# Patient Record
Sex: MALE | Race: BLACK OR AFRICAN AMERICAN | NOT HISPANIC OR LATINO | Employment: FULL TIME | URBAN - METROPOLITAN AREA
[De-identification: names, ages, dates, MRNs, and addresses within clinical notes are randomized per-mention and may not be internally consistent; named-entity substitution may affect disease eponyms.]

---

## 2018-01-25 ENCOUNTER — HOSPITAL ENCOUNTER (EMERGENCY)
Facility: HOSPITAL | Age: 23
Discharge: HOME | End: 2018-01-25
Attending: EMERGENCY MEDICINE | Admitting: EMERGENCY MEDICINE

## 2018-01-25 VITALS
DIASTOLIC BLOOD PRESSURE: 82 MMHG | OXYGEN SATURATION: 96 % | TEMPERATURE: 98.6 F | BODY MASS INDEX: 26.66 KG/M2 | HEIGHT: 69 IN | SYSTOLIC BLOOD PRESSURE: 141 MMHG | WEIGHT: 180 LBS | RESPIRATION RATE: 16 BRPM | HEART RATE: 85 BPM

## 2018-01-25 DIAGNOSIS — B34.9 VIRAL SYNDROME: Primary | ICD-10-CM

## 2018-01-25 DIAGNOSIS — J02.9 SORE THROAT: ICD-10-CM

## 2018-01-25 LAB
FLUAV AG NPH QL: NEGATIVE
FLUBV AG NPH QL IA: NEGATIVE
S PYO AG THROAT QL: NEGATIVE

## 2018-01-25 PROCEDURE — 87804 INFLUENZA ASSAY W/OPTIC: CPT | Performed by: EMERGENCY MEDICINE

## 2018-01-25 PROCEDURE — 87081 CULTURE SCREEN ONLY: CPT | Performed by: EMERGENCY MEDICINE

## 2018-01-25 PROCEDURE — 87880 STREP A ASSAY W/OPTIC: CPT | Performed by: EMERGENCY MEDICINE

## 2018-01-25 PROCEDURE — 99283 EMERGENCY DEPT VISIT LOW MDM: CPT

## 2018-01-25 RX ORDER — ACETAMINOPHEN 500 MG
1000 TABLET ORAL ONCE
Status: COMPLETED | OUTPATIENT
Start: 2018-01-25 | End: 2018-01-25

## 2018-01-25 RX ADMIN — ACETAMINOPHEN 1000 MG: 500 TABLET ORAL at 10:08

## 2018-01-25 NOTE — ED PROVIDER NOTES
Subjective   HPI Comments: 22-year-old male, otherwise healthy, presents complaining of sore throat, chills, and myalgias.  He states that he has had the aforementioned symptoms for the past 3 days.  No fevers.  No neck pain or stiffness.  No recent travel.  No recent sick contacts.  He is unsure as to what may have triggered his symptoms.  He is tolerating by mouth without difficulty.    Patient is a 22 y.o. male presenting with sore throat.   History provided by:  Patient  Sore Throat   Location:  Generalized  Quality:  Sore  Severity:  Mild  Onset quality:  Sudden  Duration:  3 days  Timing:  Constant  Progression:  Unchanged  Chronicity:  New  Relieved by:  None tried  Worsened by:  Nothing  Ineffective treatments:  None tried  Associated symptoms: chills and night sweats    Associated symptoms: no cough, no fever and no sinus congestion    Risk factors: no sick contacts        Review of Systems   Constitutional: Positive for chills and night sweats. Negative for fever.   HENT: Positive for sore throat.    Respiratory: Negative for cough.    All other systems reviewed and are negative.      Past Medical History:   Diagnosis Date   • ADHD (attention deficit hyperactivity disorder)    • Allergic        No Known Allergies    History reviewed. No pertinent surgical history.    Family History   Problem Relation Age of Onset   • Cancer Father        Social History     Social History   • Marital status: Single     Spouse name: N/A   • Number of children: N/A   • Years of education: N/A     Social History Main Topics   • Smoking status: Never Smoker   • Smokeless tobacco: Never Used   • Alcohol use 0.6 oz/week     1 Standard drinks or equivalent per week   • Drug use: No   • Sexual activity: Not Asked     Other Topics Concern   • None     Social History Narrative         Objective   Physical Exam   Constitutional: He is oriented to person, place, and time. He appears well-developed and well-nourished. No distress.    Well-appearing male in no acute distress   HENT:   Head: Normocephalic and atraumatic.   Mouth/Throat: Oropharynx is clear and moist.   No trismus; normal voice; no exudates; no asymmetry; uvula midline   Neck: Normal range of motion. No JVD present.   No meningeal signs   Cardiovascular: Normal rate, regular rhythm and normal heart sounds.  Exam reveals no gallop and no friction rub.    No murmur heard.  Pulmonary/Chest: Effort normal and breath sounds normal. No respiratory distress. He has no wheezes. He has no rales.   Musculoskeletal: Normal range of motion.   Lymphadenopathy:     He has no cervical adenopathy.   Neurological: He is alert and oriented to person, place, and time.   Normal gait   Skin: Skin is warm and dry. No rash noted. He is not diaphoretic. No erythema. No pallor.   Psychiatric: He has a normal mood and affect. Judgment and thought content normal.   Nursing note and vitals reviewed.      Procedures         ED Course  ED Course   Comment By Time   22-year-old male, otherwise healthy, presents complaining of chills, myalgias, and sore throat ×3 days.  On arrival to the ED, patient well-appearing with unremarkable exam and reassuring vital signs.  Tylenol given.  We will obtain flu and strep swabs and reassess. Charanjit Kingston MD 01/25 1007   Strep and flu negative.  Patient reassured and counseled regarding symptomatic treatment of likely viral syndrome.  He will follow-up with his primary care physician within one week.  Agreeable with plan and given appropriate return precautions. Charanjit Kingston MD 01/25 1058       Recent Results (from the past 24 hour(s))   Influenza Antigen, Rapid - Swab, Nasopharynx    Collection Time: 01/25/18 10:04 AM   Result Value Ref Range    Influenza A Ag, EIA Negative Negative    Influenza B Ag, EIA Negative Negative   Rapid Strep A Screen - Swab, Throat    Collection Time: 01/25/18 10:04 AM   Result Value Ref Range    Strep A Ag Negative Negative  "    Note: In addition to lab results from this visit, the labs listed above may include labs taken at another facility or during a different encounter within the last 24 hours. Please correlate lab times with ED admission and discharge times for further clarification of the services performed during this visit.    No orders to display     Vitals:    01/25/18 0957   BP: 141/82   BP Location: Left arm   Patient Position: Sitting   Pulse: 87   Resp: 14   Temp: 98.4 °F (36.9 °C)   TempSrc: Oral   SpO2: 97%   Weight: 81.6 kg (180 lb)   Height: 175.3 cm (69\")     Medications   acetaminophen (TYLENOL) tablet 1,000 mg (1,000 mg Oral Given 1/25/18 1008)     ECG/EMG Results (last 24 hours)     ** No results found for the last 24 hours. **                  Recent Results (from the past 24 hour(s))   Influenza Antigen, Rapid - Swab, Nasopharynx    Collection Time: 01/25/18 10:04 AM   Result Value Ref Range    Influenza A Ag, EIA Negative Negative    Influenza B Ag, EIA Negative Negative   Rapid Strep A Screen - Swab, Throat    Collection Time: 01/25/18 10:04 AM   Result Value Ref Range    Strep A Ag Negative Negative     Note: In addition to lab results from this visit, the labs listed above may include labs taken at another facility or during a different encounter within the last 24 hours. Please correlate lab times with ED admission and discharge times for further clarification of the services performed during this visit.    No orders to display     Vitals:    01/25/18 0957   BP: 141/82   BP Location: Left arm   Patient Position: Sitting   Pulse: 87   Resp: 14   Temp: 98.4 °F (36.9 °C)   TempSrc: Oral   SpO2: 97%   Weight: 81.6 kg (180 lb)   Height: 175.3 cm (69\")     Medications   acetaminophen (TYLENOL) tablet 1,000 mg (1,000 mg Oral Given 1/25/18 1008)     ECG/EMG Results (last 24 hours)     ** No results found for the last 24 hours. **            [facility]    Final diagnoses:   Viral syndrome   Sore throat       Documentation " assistance provided by mahendra Zamorano.  Information recorded by the mahendra was done at my direction and has been verified and validated by me.     Daryl Zamorano  01/25/18 1003       Aundrea Alas  01/25/18 1004       Daryl Zamorano  01/25/18 1032       Daryl Zamorano  01/25/18 1101       Charanjit Kingston MD  01/25/18 1118

## 2018-01-25 NOTE — DISCHARGE INSTRUCTIONS
Follow up with primary care physician in one week. Call to make an appointment.     Immediately return to the emergency department for any new or worsening symptoms.     Please review the medications you are supposed to be taking according to prior physician recommendations. I have not changed your home medications during this visit. If your discharge instructions indicate that I have changed your home medications, this is not the case, and you should disregard. If you have any questions about the medication you should be taking at home, please call your physician.

## 2018-01-27 ENCOUNTER — HOSPITAL ENCOUNTER (EMERGENCY)
Facility: HOSPITAL | Age: 23
Discharge: HOME | End: 2018-01-27
Attending: EMERGENCY MEDICINE | Admitting: EMERGENCY MEDICINE

## 2018-01-27 VITALS
OXYGEN SATURATION: 100 % | RESPIRATION RATE: 16 BRPM | BODY MASS INDEX: 26.66 KG/M2 | WEIGHT: 180 LBS | HEIGHT: 69 IN | SYSTOLIC BLOOD PRESSURE: 135 MMHG | TEMPERATURE: 100.9 F | DIASTOLIC BLOOD PRESSURE: 75 MMHG | HEART RATE: 100 BPM

## 2018-01-27 DIAGNOSIS — J02.9 PHARYNGITIS, UNSPECIFIED ETIOLOGY: ICD-10-CM

## 2018-01-27 DIAGNOSIS — B08.5 HERPANGINA: ICD-10-CM

## 2018-01-27 DIAGNOSIS — R50.9 FEVER AND CHILLS: ICD-10-CM

## 2018-01-27 DIAGNOSIS — K13.70 LESION OF ORAL MUCOSA: Primary | ICD-10-CM

## 2018-01-27 LAB
BACTERIA SPEC AEROBE CULT: NORMAL
S PYO AG THROAT QL: NEGATIVE

## 2018-01-27 PROCEDURE — 87255 GENET VIRUS ISOLATE HSV: CPT | Performed by: PHYSICIAN ASSISTANT

## 2018-01-27 PROCEDURE — 87081 CULTURE SCREEN ONLY: CPT | Performed by: PHYSICIAN ASSISTANT

## 2018-01-27 PROCEDURE — 99283 EMERGENCY DEPT VISIT LOW MDM: CPT

## 2018-01-27 PROCEDURE — 87880 STREP A ASSAY W/OPTIC: CPT | Performed by: PHYSICIAN ASSISTANT

## 2018-01-27 RX ORDER — AMOXICILLIN 500 MG/1
1000 CAPSULE ORAL 3 TIMES DAILY
Qty: 30 CAPSULE | Refills: 0 | Status: SHIPPED | OUTPATIENT
Start: 2018-01-27

## 2018-01-27 RX ORDER — ACYCLOVIR 400 MG/1
400 TABLET ORAL 3 TIMES DAILY
Qty: 30 TABLET | Refills: 0 | Status: SHIPPED | OUTPATIENT
Start: 2018-01-27

## 2018-01-27 NOTE — DISCHARGE INSTRUCTIONS
Acyclovir and Amoxil as prescribed.  Tylenol or Motrin as directed for fever and pain.  Warm salt water gargles several times daily.  Return to ER if worse.  Choose one of the [facility] providers below for follow up and to establish a PCP.  Follow up with one of the [facility] physician groups below to setup primary care. If you have trouble following up, please call Shu Martinez, our transitional care nurse, at [phone].    (Dr. Miramontes, Dr. Craig, Dr. Felipe, and Dr. Andrew.)  [facility], Primary Care, [phone], [address], [place] [address]    [facility], Primary Care, [phone], [address], [address] [place], [address] [facility], Primary Care, [phone], [address], [address] [place], [address] [facility], Primary Care, [phone], [address] [place], [address] [place], [address]     [place] [facility], Primary Care, [phone], [address], [address] [place], [address]    [place] [facility], Primary Care, [phone], [address]    [place], [address] [facility], Primary Care, [phone], [address], [address] [place], [address] [facility], Primary Care, [phone], [address], [address] [place], [address] [facility]) [facility], Primary Care, [phone], [address], [address] [place], [address] [facility], Primary Care, [phone], [address], [address] [place], [address] [place] [address], Primary Care, [phone], [address], [place], [address] [place] [address]  [facility], Primary Care, [phone], [address] [id number]

## 2018-01-27 NOTE — ED PROVIDER NOTES
"Subjective   HPI Comments: 22-year-old male presents to the emergency department with complaints of sore throat, painful lesions in the mouth and on the lower lip and a fever.  The symptoms began about 1 week ago and have worsened.  The patient states that he was seen in our emergency department several days ago with the same symptoms and was diagnosed with a \"viral syndrome\".  He states that his sore throat is much worse now and he is having fever now that was not present before no past pertinent medical history.  The patient smokes a half pack cigarettes daily.  No alcohol or drug use.  He has no PCP.    Patient is a 22 y.o. male presenting with sore throat.   History provided by:  Patient  Sore Throat   Quality:  Sore  Severity:  Severe  Onset quality:  Gradual  Duration: 1 week.  Timing:  Constant  Progression:  Worsening  Chronicity:  New  Relieved by:  Nothing  Worsened by:  Nothing  Ineffective treatments:  None tried  Associated symptoms: chills, fever and headaches    Associated symptoms: no abdominal pain, no adenopathy, no chest pain, no cough, no drooling, no ear pain, no epistaxis, no eye discharge, no neck stiffness, no rash, no rhinorrhea, no shortness of breath, no sinus congestion and no trouble swallowing        Review of Systems   Constitutional: Positive for chills and fever.   HENT: Positive for sore throat. Negative for congestion, drooling, ear pain, nosebleeds, rhinorrhea and trouble swallowing.         Sores in the mouth and lower lip   Eyes: Negative for pain, discharge and visual disturbance.   Respiratory: Negative for cough, shortness of breath and wheezing.    Cardiovascular: Negative for chest pain, palpitations and leg swelling.   Gastrointestinal: Negative for abdominal pain, blood in stool, diarrhea, nausea and vomiting.   Endocrine: Negative.    Genitourinary: Negative for dysuria, hematuria and urgency.   Musculoskeletal: Negative for arthralgias, back pain and neck stiffness. "   Skin: Negative for pallor and rash.   Allergic/Immunologic: Negative for immunocompromised state.   Neurological: Positive for headaches. Negative for dizziness, speech difficulty and weakness.   Hematological: Negative for adenopathy. Does not bruise/bleed easily.   Psychiatric/Behavioral: Negative.        Past Medical History:   Diagnosis Date   • ADHD (attention deficit hyperactivity disorder)    • Allergic        No Known Allergies    History reviewed. No pertinent surgical history.    Family History   Problem Relation Age of Onset   • Cancer Father        Social History     Social History   • Marital status: Single     Spouse name: N/A   • Number of children: N/A   • Years of education: N/A     Social History Main Topics   • Smoking status: Never Smoker   • Smokeless tobacco: Never Used   • Alcohol use 0.6 oz/week     1 Standard drinks or equivalent per week   • Drug use: No   • Sexual activity: Yes     Other Topics Concern   • None     Social History Narrative   • None           Objective   Physical Exam   Constitutional: He appears well-developed and well-nourished. No distress.   HENT:   The posterior pharynx has erythema and white exudate present.  No abscess.  The patient has a papule on his lower lip that appears herpetic.  He also has a lesion inside the lower lip and another lesion under his tongue.   Eyes: Conjunctivae and EOM are normal. Pupils are equal, round, and reactive to light.   Neck: Normal range of motion. Neck supple.   Cardiovascular: Normal rate, regular rhythm and normal heart sounds.    Pulmonary/Chest: Effort normal and breath sounds normal.   Abdominal: Soft. Bowel sounds are normal. There is no tenderness.   Musculoskeletal: Normal range of motion.   Lymphadenopathy:     He has no cervical adenopathy.   Neurological: He is alert.   Skin: Skin is warm and dry.   Psychiatric: He has a normal mood and affect.       Procedures         ED Course  ED Course      The patient has several  lesions that appear herpetic.  He also has extensive pharyngeal erythema with white exudate.  No abscess seen.  He has 2 lesions inside the mouth that appear to be apthous ulcers.  The patient states that he does engage in oral vaginal intercourse but states that his girlfriend does not have any vaginal lesions that he is aware of.  She confirms this.  The patient now has a fever 101.  He otherwise does not appear septic.  Previous throat culture from last week is negative for hemolytic strep.  A repeat strep screen and throat culture have been obtained as well as herpes culture.  Given his fever and the worsening lesions, I am inclined to treat him with both acyclovir and an antibiotic and have him follow-up.  I have encouraged him to use warm salt water swishes and gargles.            MDM    Final diagnoses:   Lesion of oral mucosa   Herpangina   Pharyngitis, unspecified etiology   Fever and chills            AUTUMN Awad  01/27/18 8465

## 2018-01-29 LAB — BACTERIA SPEC AEROBE CULT: NORMAL

## 2018-01-30 LAB — HSV SPEC CULT: POSITIVE

## 2018-05-12 ENCOUNTER — APPOINTMENT (OUTPATIENT)
Dept: GENERAL RADIOLOGY | Facility: HOSPITAL | Age: 23
End: 2018-05-12

## 2018-05-12 ENCOUNTER — HOSPITAL ENCOUNTER (EMERGENCY)
Facility: HOSPITAL | Age: 23
Discharge: HOME | End: 2018-05-12
Attending: EMERGENCY MEDICINE | Admitting: EMERGENCY MEDICINE

## 2018-05-12 VITALS
WEIGHT: 185 LBS | HEIGHT: 69 IN | OXYGEN SATURATION: 96 % | BODY MASS INDEX: 27.4 KG/M2 | RESPIRATION RATE: 16 BRPM | DIASTOLIC BLOOD PRESSURE: 74 MMHG | SYSTOLIC BLOOD PRESSURE: 124 MMHG | TEMPERATURE: 98.5 F | HEART RATE: 74 BPM

## 2018-05-12 DIAGNOSIS — S83.91XA SPRAIN OF RIGHT KNEE, INITIAL ENCOUNTER: ICD-10-CM

## 2018-05-12 DIAGNOSIS — M25.561 ACUTE PAIN OF RIGHT KNEE: Primary | ICD-10-CM

## 2018-05-12 PROCEDURE — 73560 X-RAY EXAM OF KNEE 1 OR 2: CPT

## 2018-05-12 PROCEDURE — 99283 EMERGENCY DEPT VISIT LOW MDM: CPT

## 2018-05-12 RX ORDER — HYDROCODONE BITARTRATE AND ACETAMINOPHEN 5; 325 MG/1; MG/1
1 TABLET ORAL EVERY 6 HOURS PRN
Qty: 12 TABLET | Refills: 0 | Status: SHIPPED | OUTPATIENT
Start: 2018-05-12

## 2018-05-12 RX ORDER — HYDROCODONE BITARTRATE AND ACETAMINOPHEN 5; 325 MG/1; MG/1
1 TABLET ORAL ONCE
Status: COMPLETED | OUTPATIENT
Start: 2018-05-12 | End: 2018-05-12

## 2018-05-12 RX ADMIN — HYDROCODONE BITARTRATE AND ACETAMINOPHEN 1 TABLET: 5; 325 TABLET ORAL at 03:44

## 2018-05-12 NOTE — ED PROVIDER NOTES
Subjective   Estella Peacock is a 23 y.o.male who presents to the ED with his friend by personal vehicle with c/o right knee pain with onset several hours ago that has remained constant. He was playing basketball when he jumped then landed on his right leg in a twisting motion. He denies any falls and states that he was able to bear weight and ambulate although it caused him to experience worsening pain. His pain significantly worsens with any movement or palpation of the right knee. He denies any other past medical history or any other acute complaints at this time.           History provided by:  Patient  Leg Pain   Location:  Knee  Injury: yes    Mechanism of injury: fall    Fall:     Fall occurred:  Recreating/playing    Impact surface:  Vevay    Point of impact:  Feet    Entrapped after fall: no    Knee location:  R knee  Pain details:     Quality:  Sharp    Radiates to:  Does not radiate    Severity:  Moderate    Onset quality:  Gradual    Timing:  Constant    Progression:  Worsening  Chronicity:  New  Dislocation: no    Foreign body present:  No foreign bodies  Tetanus status:  Unknown  Prior injury to area:  No  Relieved by:  Immobilization  Worsened by:  Bearing weight, flexion, extension and activity  Ineffective treatments:  None tried  Associated symptoms: decreased ROM    Associated symptoms: no swelling and no tingling    Risk factors: no recent illness        Review of Systems   Cardiovascular: Negative for leg swelling.   Musculoskeletal: Positive for arthralgias.   Neurological: Negative for numbness.   All other systems reviewed and are negative.      Past Medical History:   Diagnosis Date   • ADHD (attention deficit hyperactivity disorder)    • Allergic        No Known Allergies    History reviewed. No pertinent surgical history.    Family History   Problem Relation Age of Onset   • Cancer Father        Social History     Social History   • Marital status: Single     Social History Main  Topics   • Smoking status: Never Smoker   • Smokeless tobacco: Never Used   • Alcohol use Yes      Comment: OCCASIONAL   • Drug use: No   • Sexual activity: Yes     Other Topics Concern   • Not on file         Objective   Physical Exam   Constitutional: He is oriented to person, place, and time. He appears well-developed and well-nourished. No distress.   HENT:   Head: Normocephalic and atraumatic.   Right Ear: External ear normal.   Left Ear: External ear normal.   Nose: Nose normal.   Eyes: Conjunctivae are normal. No scleral icterus.   Neck: Normal range of motion. Neck supple.   Cardiovascular: Normal rate.    Pulmonary/Chest: Effort normal.   Abdominal: Soft.   Musculoskeletal: He exhibits tenderness. He exhibits no deformity.   TTP posterior right knee.  Laxity with Veress and valgus stress.  Pain exhibited with attempting anterior and posterior dosi limited due to worsening pain.    Neurological: He is alert and oriented to person, place, and time.   Skin: Skin is warm and dry. Capillary refill takes less than 2 seconds. He is not diaphoretic.   Psychiatric: He has a normal mood and affect. His behavior is normal.   Nursing note and vitals reviewed.      Procedures         ED Course  ED Course     No results found for this or any previous visit (from the past 24 hour(s)).  Note: In addition to lab results from this visit, the labs listed above may include labs taken at another facility or during a different encounter within the last 24 hours. Please correlate lab times with ED admission and discharge times for further clarification of the services performed during this visit.    XR Knee 1 or 2 View Right   Final Result      No definite acute bone abnormality.      Small suprapatellar bursal effusion.      If significant soft tissue injury is clinically suspected, MRI could be formed    which better evaluates intra-articular soft tissue structures.         THIS DOCUMENT HAS BEEN ELECTRONICALLY SIGNED BY MARY  "MARCOS NOVAK        Vitals:    05/12/18 0248   BP: 128/77   BP Location: Left arm   Patient Position: Sitting   Pulse: 78   Resp: 16   Temp: 98.7 °F (37.1 °C)   TempSrc: Oral   SpO2: 96%   Weight: 83.9 kg (185 lb)   Height: 175.3 cm (69\")     Medications   HYDROcodone-acetaminophen (NORCO) 5-325 MG per tablet 1 tablet (1 tablet Oral Given 5/12/18 9678)     ECG/EMG Results (last 24 hours)     ** No results found for the last 24 hours. **                          MDM  Number of Diagnoses or Management Options  Acute pain of right knee: new and requires workup  Sprain of right knee, initial encounter: new and requires workup  Diagnosis management comments: Is concerned that the patient has sprained potentially tore a ligament to the right knee.  Patient's pain precludes obtaining a detailed exam.    X-ray does not show any acute fracture.    Patient will be given a knee immobilizer, advised to keep elevated and apply ice, and use crutches to aid in ambulation.    Patient will be referred to Dr. Dinero, orthopedic surgery, for outpatient evaluation.       Amount and/or Complexity of Data Reviewed  Tests in the radiology section of CPT®: ordered and reviewed  Review and summarize past medical records: yes  Independent visualization of images, tracings, or specimens: yes    Patient Progress  Patient progress: stable      Final diagnoses:   Acute pain of right knee   Sprain of right knee, initial encounter       Documentation assistance provided by mahendra Soares.  Information recorded by the scribe was done at my direction and has been verified and validated by me.     Benjie Soares  05/12/18 0359       Caprice Ji MD  05/12/18 0506    "

## 2018-05-12 NOTE — DISCHARGE INSTRUCTIONS
Apply ice to right knee.     Wear knee immobilizer and use crutches to aid in ambulation.     Follow-up with orthopedic surgery for outpatient evaluation.

## 2023-04-06 ENCOUNTER — HOSPITAL ENCOUNTER (EMERGENCY)
Facility: HOSPITAL | Age: 28
Discharge: HOME | End: 2023-04-06
Attending: EMERGENCY MEDICINE | Admitting: EMERGENCY MEDICINE

## 2023-04-06 ENCOUNTER — APPOINTMENT (OUTPATIENT)
Dept: GENERAL RADIOLOGY | Facility: HOSPITAL | Age: 28
End: 2023-04-06

## 2023-04-06 VITALS
HEIGHT: 62 IN | HEART RATE: 73 BPM | BODY MASS INDEX: 44.16 KG/M2 | DIASTOLIC BLOOD PRESSURE: 86 MMHG | OXYGEN SATURATION: 99 % | TEMPERATURE: 98.4 F | WEIGHT: 240 LBS | SYSTOLIC BLOOD PRESSURE: 149 MMHG | RESPIRATION RATE: 14 BRPM

## 2023-04-06 DIAGNOSIS — J20.8 ACUTE VIRAL BRONCHITIS: Primary | ICD-10-CM

## 2023-04-06 LAB
FLUAV RNA RESP QL NAA+PROBE: NOT DETECTED
FLUBV RNA RESP QL NAA+PROBE: NOT DETECTED
QT INTERVAL: 374 MS
QTC INTERVAL: 409 MS
RSV RNA NPH QL NAA+NON-PROBE: NOT DETECTED
SARS-COV-2 RNA RESP QL NAA+PROBE: NOT DETECTED

## 2023-04-06 PROCEDURE — 71045 X-RAY EXAM CHEST 1 VIEW: CPT

## 2023-04-06 PROCEDURE — 87637 SARSCOV2&INF A&B&RSV AMP PRB: CPT | Performed by: EMERGENCY MEDICINE

## 2023-04-06 PROCEDURE — 93005 ELECTROCARDIOGRAM TRACING: CPT | Performed by: EMERGENCY MEDICINE

## 2023-04-06 PROCEDURE — C9803 HOPD COVID-19 SPEC COLLECT: HCPCS | Performed by: EMERGENCY MEDICINE

## 2023-04-06 PROCEDURE — 99282 EMERGENCY DEPT VISIT SF MDM: CPT

## 2023-04-06 RX ORDER — ALBUTEROL SULFATE 90 UG/1
2 AEROSOL, METERED RESPIRATORY (INHALATION) EVERY 6 HOURS PRN
Qty: 8 G | Refills: 0 | Status: SHIPPED | OUTPATIENT
Start: 2023-04-06

## 2023-04-06 NOTE — DISCHARGE INSTRUCTIONS
Push fluids.  I wrote you for an albuterol inhaler which may help with your symptoms.    Very important to discontinue smoking.    I have referred you to our primary care contact number.

## 2023-04-06 NOTE — ED PROVIDER NOTES
EMERGENCY DEPARTMENT ENCOUNTER    Pt Name: Estella Peacock  MRN: [id number]  Pt :   1995  Room Number:  1818  Date of encounter:  2023  PCP: Provider, No Known  ED Provider: Semaj Knight MD    Historian: Patient      HPI:  Chief Complaint: Cough, shortness of breath        Context: Estella Peacock is a 28 y.o. male who presents to the ED c/o cough with mild shortness of breath ongoing for the last week.  The cough has been nonproductive.  He does not recall being around anyone who is sick.  He denies significant dyspnea and is able to ambulate without any shortness of breath.  He denies chest pain and hemoptysis.  No swelling of legs.  No DVT/PE history.        PAST MEDICAL HISTORY  Past Medical History:   Diagnosis Date   • ADHD (attention deficit hyperactivity disorder)    • Allergic          PAST SURGICAL HISTORY  No past surgical history on file.      FAMILY HISTORY  Family History   Problem Relation Age of Onset   • Cancer Father          SOCIAL HISTORY  Social History     Socioeconomic History   • Marital status: Single   Tobacco Use   • Smoking status: Never   • Smokeless tobacco: Never   Substance and Sexual Activity   • Alcohol use: Yes     Comment: OCCASIONAL   • Drug use: No   • Sexual activity: Yes         ALLERGIES  Patient has no known allergies.        REVIEW OF SYSTEMS  Review of Systems       All systems reviewed and negative except for those discussed in HPI.       PHYSICAL EXAM    I have reviewed the triage vital signs and nursing notes.    ED Triage Vitals [23 1635]   Temp Heart Rate Resp BP SpO2   98.4 °F (36.9 °C) 73 14 149/86 99 %      Temp src Heart Rate Source Patient Position BP Location FiO2 (%)   Oral Monitor Sitting Left arm --       Physical Exam  GENERAL:   Appears in no acute distress.  Strong odor of cannabis present on patient.  HENT: Nares patent.  EYES: No scleral icterus.  CV: Regular rhythm, regular rate.  No murmurs gallops rubs clear to auscultation  in all fields anteriorly and posteriorly  RESPIRATORY: Normal effort.  No audible wheezes, rales or rhonchi.  ABDOMEN: Soft, nontender  MUSCULOSKELETAL: No deformities.   NEURO: Alert, moves all extremities, follows commands.  SKIN: Warm, dry, no rash visualized.      LAB RESULTS  Recent Results (from the past 24 hour(s))   ECG 12 Lead Other; SOA    Collection Time: 04/06/23  4:41 PM   Result Value Ref Range    QT Interval 374 ms    QTC Interval 409 ms   COVID-19, FLU A/B, RSV PCR - Swab, Nasopharynx    Collection Time: 04/06/23  5:18 PM    Specimen: Nasopharynx; Swab   Result Value Ref Range    COVID19 Not Detected Not Detected - Ref. Range    Influenza A PCR Not Detected Not Detected    Influenza B PCR Not Detected Not Detected    RSV, PCR Not Detected Not Detected       If labs were ordered, I independently reviewed the results and considered them in treating the patient.        RADIOLOGY  XR Chest 1 View    Result Date: 4/6/2023  XR CHEST 1 VW Date of Exam: 4/6/2023 4:59 PM EDT Indication: cough. Comparison: None available. Findings: Normal cardiomediastinal silhouette. The lungs are clear. No pleural effusion or pneumothorax. No acute osseous findings.     Impression: No acute cardiopulmonary findings.  Electronically Signed: Stefan Degroot  4/6/2023 5:31 PM EDT  Workstation ID: [id number]      I ordered and independently reviewed the above noted radiographic studies.      I viewed images of chest x-ray which showed no active disease per my independent interpretation.    See radiologist's dictation for official interpretation.        PROCEDURES    Procedures    ECG 12 Lead Other; SOA   Final Result   Test Reason : Other~   Blood Pressure :   */*   mmHG   Vent. Rate :  72 BPM     Atrial Rate :  72 BPM      P-R Int : 136 ms          QRS Dur :  82 ms       QT Int : 374 ms       P-R-T Axes :  34  42  11 degrees      QTc Int : 409 ms      Normal sinus rhythm   Normal ECG   No previous ECGs available   Confirmed by  ELSY BERG MD ([id number]) on 4/6/2023 6:17:45 PM      Referred By: EDMD           Confirmed By: ELSY BERG MD          MEDICATIONS GIVEN IN ER    Medications - No data to display      MEDICAL DECISION MAKING, PROGRESS, and CONSULTS    All labs have been independently reviewed by me.  All radiology studies have been reviewed by me and the radiologist dictating the report.  All EKG's have been independently viewed and interpreted by me.      Discussion below represents my analysis of pertinent findings related to patient's condition, differential diagnosis, treatment plan and final disposition.      Differential diagnosis:    Bronchitis versus pneumonia versus COVID versus RSV versus PE, etc.      Additional sources:      - External (non-ED) record review: Epic chart review    - Chronic or social conditions impacting care: Chronic use of marijuana and tobacco    - Shared decision making: Patient in full agreement with current plan      Orders placed during this visit:  Orders Placed This Encounter   Procedures   • COVID PRE-OP / PRE-PROCEDURE SCREENING ORDER (NO ISOLATION) - Swab, Nasopharynx   • COVID-19, FLU A/B, RSV PCR - Swab, Nasopharynx   • XR Chest 1 View   • ECG 12 Lead Other; SOA         Additional orders considered but not ordered:  CTA chest    ED Course:    Consultants:                      AS OF 18:20 EDT VITALS:    BP - 149/86  HR - 73  TEMP - 98.4 °F (36.9 °C) (Oral)  O2 SATS - 99%                  DIAGNOSIS  Final diagnoses:   Acute viral bronchitis         DISPOSITION  DISCHARGE    Patient discharged in stable condition.    Reviewed implications of results, diagnosis, meds, responsibility to follow up, warning signs and symptoms of possible worsening, potential complications and reasons to return to ER.    Patient/Family voiced understanding of above instructions.    Discussed plan for discharge, as there is no emergent indication for admission.  Pt/family is agreeable and understands need for follow  up and possible repeat testing.  Pt/family is aware that discharge does not mean that nothing is wrong but that it indicates no emergency is currently present that requires admission and they must continue care with follow-up as given below or with a physician of their choice.     FOLLOW-UP  PATIENT CONNECTION - [facility] [address]  [phone]    NEXT AVAILABLE APPOINTMENT - RECHECK OF CONDITION    [facility] Emergency Department  [address] [address] [phone]    IF YOU HAVE ANY CONCERN OF WORSENING CONDITION         Medication List      New Prescriptions    albuterol sulfate  (90 Base) MCG/ACT inhaler  Commonly known as: PROVENTIL HFA;VENTOLIN HFA;PROAIR HFA  Inhale 2 puffs Every 6 (Six) Hours As Needed for Wheezing. With AeroChamber           Where to Get Your Medications      These medications were sent to [facility] [address] [place] [address] - [address] [place] [address] [phone]  - [phone] [address] [address]    Phone: [phone]   · albuterol sulfate  (90 Base) MCG/ACT inhaler             Please note that portions of this document were completed with voice recognition software.      Semaj Knight MD  04/06/23 1005

## 2023-06-06 ENCOUNTER — HOSPITAL ENCOUNTER (EMERGENCY)
Facility: HOSPITAL | Age: 28
Discharge: HOME | End: 2023-06-06
Attending: EMERGENCY MEDICINE

## 2023-06-06 VITALS
SYSTOLIC BLOOD PRESSURE: 145 MMHG | RESPIRATION RATE: 18 BRPM | TEMPERATURE: 99.1 F | BODY MASS INDEX: 34.36 KG/M2 | HEART RATE: 69 BPM | OXYGEN SATURATION: 97 % | DIASTOLIC BLOOD PRESSURE: 101 MMHG | WEIGHT: 240 LBS | HEIGHT: 70 IN

## 2023-06-06 DIAGNOSIS — K04.7 DENTAL INFECTION: Primary | ICD-10-CM

## 2023-06-06 DIAGNOSIS — K02.9 DENTAL CARIES: ICD-10-CM

## 2023-06-06 RX ORDER — AMOXICILLIN AND CLAVULANATE POTASSIUM 875; 125 MG/1; MG/1
1 TABLET, FILM COATED ORAL ONCE
Status: COMPLETED | OUTPATIENT
Start: 2023-06-06 | End: 2023-06-06

## 2023-06-06 RX ORDER — HYDROCODONE BITARTRATE AND ACETAMINOPHEN 7.5; 325 MG/1; MG/1
1 TABLET ORAL ONCE
Status: COMPLETED | OUTPATIENT
Start: 2023-06-06 | End: 2023-06-06

## 2023-06-06 RX ORDER — AMOXICILLIN AND CLAVULANATE POTASSIUM 875; 125 MG/1; MG/1
1 TABLET, FILM COATED ORAL 2 TIMES DAILY
Qty: 20 TABLET | Refills: 0 | Status: SHIPPED | OUTPATIENT
Start: 2023-06-06 | End: 2023-06-16

## 2023-06-06 RX ADMIN — AMOXICILLIN AND CLAVULANATE POTASSIUM 1 TABLET: 875; 125 TABLET, FILM COATED ORAL at 20:34

## 2023-06-06 RX ADMIN — HYDROCODONE BITARTRATE AND ACETAMINOPHEN 1 TABLET: 7.5; 325 TABLET ORAL at 20:34

## 2023-06-06 NOTE — Clinical Note
[facility] EMERGENCY DEPARTMENT  [address] [address]  Phone: [phone]    Estella Peacock was seen and treated in our emergency department on 6/6/2023.  He may return to work on 06/08/2023.         Thank you for choosing [facility].    Abhay Malone MD

## 2023-06-07 NOTE — DISCHARGE INSTRUCTIONS
Symptomatic care is recommended. Take all medications as prescribed and instructed. Take and complete full course of antibiotics as prescribed. Follow up with dentistry as directed or return to Emergency Department with worsening of symptoms.

## 2023-06-21 NOTE — ED PROVIDER NOTES
EMERGENCY DEPARTMENT ENCOUNTER    Pt Name: Estella Peacock  MRN: [id number]  Pt :   1995  Room Number:  Room/bed info not found  Date of encounter:  2023  PCP: Provider, No Known  ED Provider: AUTUMN Robledo    Historian: Patient    HPI:  Chief Complaint: Dental Pain    Context: Estella Peacock is a 28 y.o. male who presents to the ED c/o dental pain.  Patient notes that he has a cracked tooth of his right rear lower jawline.  He states that he has a follow-up with dentistry scheduled for tomorrow but cannot seem to get pain from the tooth under control.    Dental Pain  Location:  Lower  Lower teeth location:   2nd molar  Quality:  Aching and constant  Severity:  Severe  Onset quality:  Gradual  Duration: Chronic.  Timing:  Constant  Progression:  Worsening  Chronicity:  Chronic  Context: dental caries, enamel fracture and poor dentition    Relieved by:  Nothing  Ineffective treatments:  NSAIDs, topical anesthetic gel and acetaminophen  Associated symptoms: gum swelling    Associated symptoms: no difficulty swallowing, no drooling, no facial pain, no facial swelling, no neck swelling, no oral bleeding and no oral lesions    Risk factors: lack of dental care       REVIEW OF SYSTEMS  A chief complaint appropriate review of systems was completed and is negative except as noted in the HPI.     PAST MEDICAL HISTORY  Past Medical History:   Diagnosis Date    ADHD (attention deficit hyperactivity disorder)     Allergic        PAST SURGICAL HISTORY  No past surgical history on file.    FAMILY HISTORY  Family History   Problem Relation Age of Onset    Cancer Father        SOCIAL HISTORY  Social History     Socioeconomic History    Marital status: Single   Tobacco Use    Smoking status: Never    Smokeless tobacco: Never   Substance and Sexual Activity    Alcohol use: Yes     Comment: OCCASIONAL    Drug use: No    Sexual activity: Yes       ALLERGIES  Patient has no known  allergies.    PHYSICAL EXAM  Physical Exam  Vitals and nursing note reviewed.   Constitutional:       General: He is not in acute distress.     Appearance: Normal appearance. He is not ill-appearing.   HENT:      Head: Normocephalic and atraumatic.      Nose: Nose normal.      Mouth/Throat:      Mouth: Mucous membranes are moist.      Dentition: Dental caries present.        Comments: Site where filling has cracked in patient's tooth  Eyes:      Extraocular Movements: Extraocular movements intact.   Cardiovascular:      Rate and Rhythm: Normal rate.   Pulmonary:      Effort: Pulmonary effort is normal.   Abdominal:      General: There is no distension.   Musculoskeletal:         General: Normal range of motion.      Cervical back: Normal range of motion and neck supple.   Skin:     General: Skin is warm and dry.   Neurological:      General: No focal deficit present.      Mental Status: He is alert.   Psychiatric:         Mood and Affect: Mood normal.         Behavior: Behavior normal.       LAB RESULTS      If labs were ordered, I independently reviewed the results and considered them in treating the patient.    RADIOLOGY  No orders to display     [] Radiologist's Report Reviewed:  I ordered and independently reviewed the above noted radiographic studies.  See radiologist's dictation for official interpretation.      PROCEDURES    Procedures    No orders to display       MEDICATIONS GIVEN IN ER    Medications   HYDROcodone-acetaminophen (NORCO) 7.5-325 MG per tablet 1 tablet (1 tablet Oral Given 6/6/23 2034)   amoxicillin-clavulanate (AUGMENTIN) 875-125 MG per tablet 1 tablet (1 tablet Oral Given 6/6/23 2034)       MEDICAL DECISION MAKING, PROGRESS, and CONSULTS   Medical Decision Making  Problems Addressed:  Dental caries: complicated acute illness or injury  Dental infection: complicated acute illness or injury    Risk  Prescription drug management.        All labs have been independently reviewed by me.  All  radiology studies have been reviewed by me and the radiologist dictating the report.  All EKG's have been independently viewed by me.    [] Discussed with radiology regarding test interpretation:    Discussion below represents my analysis of pertinent findings related to patient's condition, differential diagnosis, treatment plan and final disposition.    Differential diagnosis:  The differential diagnosis associated with the patient's presentation includes: Tooth fracture, avulsion, or bleeding socket. No evidence of retropharyngeal abscess, peritonsillar abscess, David’s angina, periapical abscess and other odontogenic infections.      Additional sources  Discussed/ obtained information from independent historians:   [] Spouse  [] Parent  [] Family member  [] Friend  [] EMS   [] Other:  External (non-ED) record review:   [] Inpatient record:   [] Office record:   [] Outpatient record:   [] Prior Outpatient labs:   [] Prior Outpatient radiology:   [] Primary Care record:   [] Outside ED record:   [] Other:   Patient's care impacted by:   [] Diabetes  [] Hypertension  [] Hyperlipidemia  [] Hypothyroidism   [] Coronary Artery Disease   [] COPD   [] Cancer   [] Obesity  [] GERD   [] Tobacco Abuse   [] Substance Abuse    [] Anxiety   [] Depression   [] Other:   Care significantly affected by Social Determinants of Health (housing and economic circumstances, unemployment)    [] Yes     [x] No   If yes, Patient's care significantly limited by  Social Determinants of Health including:   [] Inadequate housing   [] Low income   [] Alcoholism and drug addiction in family   [] Problems related to primary support group   [] Unemployment   [] Problems related to employment   [] Other Social Determinants of Health:     Shared decision making:  I had a discussion with the patient/family regarding diagnosis, diagnostic results, treatment plan, and medications.  The patient/family indicated understanding of these instructions.  I  spent adequate time at the bedside preceding discharge necessary to personally discuss the aftercare instructions, giving patient education, providing explanations of the results of our evaluations/findings, and my decision making to assure that the patient/family understand the plan of care.  Time was allotted to answer questions at that time and throughout the ED course.  Emphasis was placed on timely follow-up after discharge.  I also discussed the potential for the development of an acute emergent condition requiring further evaluation, admission, or even surgical intervention. I discussed that we found nothing during the visit today indicating the need for further workup, admission, or the presence of an unstable medical condition.  I encouraged the patient to return to the emergency department immediately for ANY concerns, worsening, new complaints, or if symptoms persist and unable to seek follow-up in a timely fashion.  The patient/family expressed understanding and agreement with this plan.  The patient will follow-up with dentistry as scheduled for reevaluation.      Orders placed during this visit:  No orders of the defined types were placed in this encounter.      I considered prescription management  with:   [x] Pain medication; 1 dose of pain medication provided in ED. The use of prescription pain medication was considered in this patient however not implemented as risks outweigh benefits of prescription pain management and it was felt patient's symptoms could be managed with OTC anti-inflammatory medications and Tylenol. Will defer prescribed pain medication to primary care and/or dentistry.    [] Antiviral  [x] Antibiotic: Implemented as prescribed for tooth infection   [] Other:   Rationale:    ED Course:    ED Course as of 06/21/23 1517   Tue Jun 06, 2023 2017 Patient presents to the ED with complaints of dental pain.  Patient is not immunosuppressed.  No evidence on physical exam of tooth  fracture, avulsion or bleeding socket.  No findings consistent with retropharyngeal abscess, peritonsillar abscess, Humberto's angina or periapical abscess.  No physical exam findings concerning for gingival hyperplasia or drug reaction.  Patient treated symptomatically while in the emergency department.  Discharged home with recommendations for symptomatic care with antibiotics for dental infection and follow-up with general dentistry as scheduled.  Discussed return precautions for odontogenic infections and other dental pain emergencies. [JG]      ED Course User Index  [JG] Leander Frey PA            DIAGNOSIS  Final diagnoses:   Dental infection   Dental caries       DISPOSITION    ED Disposition       ED Disposition   Discharge    Condition   Stable    Comment   --               Please note that portions of this document were completed with voice recognition software.        Leander Frey PA  06/21/23 3792